# Patient Record
Sex: FEMALE | Race: ASIAN
[De-identification: names, ages, dates, MRNs, and addresses within clinical notes are randomized per-mention and may not be internally consistent; named-entity substitution may affect disease eponyms.]

---

## 2018-12-30 ENCOUNTER — HOSPITAL ENCOUNTER (EMERGENCY)
Dept: HOSPITAL 88 - FSED | Age: 28
Discharge: HOME | End: 2018-12-30
Payer: COMMERCIAL

## 2018-12-30 VITALS — BODY MASS INDEX: 24.54 KG/M2 | HEIGHT: 60 IN | WEIGHT: 125 LBS

## 2018-12-30 DIAGNOSIS — J11.1: ICD-10-CM

## 2018-12-30 DIAGNOSIS — R05: ICD-10-CM

## 2018-12-30 DIAGNOSIS — R50.9: Primary | ICD-10-CM

## 2018-12-30 PROCEDURE — 87400 INFLUENZA A/B EACH AG IA: CPT

## 2018-12-30 PROCEDURE — 99283 EMERGENCY DEPT VISIT LOW MDM: CPT

## 2019-08-11 ENCOUNTER — HOSPITAL ENCOUNTER (EMERGENCY)
Dept: HOSPITAL 88 - ER | Age: 29
Discharge: HOME | End: 2019-08-11
Payer: COMMERCIAL

## 2019-08-11 VITALS — BODY MASS INDEX: 24.54 KG/M2 | WEIGHT: 125 LBS | HEIGHT: 60 IN

## 2019-08-11 VITALS — SYSTOLIC BLOOD PRESSURE: 112 MMHG | DIASTOLIC BLOOD PRESSURE: 56 MMHG

## 2019-08-11 DIAGNOSIS — T39.314A: ICD-10-CM

## 2019-08-11 DIAGNOSIS — T78.3XXA: Primary | ICD-10-CM

## 2019-08-11 PROCEDURE — 96372 THER/PROPH/DIAG INJ SC/IM: CPT

## 2019-08-11 PROCEDURE — 99282 EMERGENCY DEPT VISIT SF MDM: CPT

## 2019-08-11 NOTE — XMS REPORT
Patient Summary Document

                             Created on: 2019



ROSA RODRIGUEZLY

External Reference #: [id number]

: 1990

Sex: Female



Demographics







                          Address                   [address] [place]  [address]

 

                          Home Phone                [phone]

 

                          Preferred Language        Unknown

 

                          Marital Status            Unknown

 

                          Gnosticist Affiliation     Unknown

 

                          Race                      Unknown

 

                                        Additional Race(s)  

 

                          Ethnic Group              Unknown





Author







                          Author                    [facility]

 

                          [facility]

 

                          Address                   Unknown

 

                          Phone                     Unavailable







Support







                Name            Relationship    Address         Phone

 

                    BRITTNEY RODRIGUEZ    PRS                 [address]

[place]  [address]                      [phone]

 

                    BRITTNEY RODRIGUEZ    PRS                 [address]

[place]  [address]                      [phone]

 

                    BRITTNEY RODRIGUEZ    PRS                 [address] [place]  [address]                      [phone]

 

                    BALJIT RODRIGUEZ    PRS                 [address] [place]  [address]                      [phone]







Care Team Providers







                    Care Team Member Name    Role                Phone

 

                          Unavailable               Unavailable







Payers







             Payer Name    Policy Type    Policy Number    Effective Date    Expiration Date







Problems

This patient has no known problems.



Allergies, Adverse Reactions, Alerts







          Allergy Name    Allergy Type    Status    Severity    Reaction(s)    Onset Date    Inactive 

Date                      Treating Clinician        Comments

 

        No Known Allergies    DA      Active    U               2019 00:00:00                     

 

        penicillin G    DA      Active    MO              2019 00:00:00                     

 

        No Known Allergies    DA      Active    U               2015 00:00:00                     







Medications

This patient has no known medications.



Results







           Test Description    Test Time    Test Comments    Text Results    Atomic Results    Result

 Comments

 

                [facility]        2019 17:06:00                    --------------------------------------------------------------------------------------------RUN

 DATE: 19                         [facility]                          
PAGE 1   RUN TIME: 1706                            Specimen Inquiry             
      RUN USER: INTERFACE                                                       
   ----------------------------------------------------------------
----------------------------PATIENT: JERRY RODRIGUEZ               ACCT #: 
[id number] LOC:  KELLY      U #: [id number]                                   
   AGE/SX: 28/F         [facility]     RE19REG DR:  Le Cohen MD            :    [birth year]     BED:  A          DIS: 19               
                       STATUS: DIS IN       TLOC:           
----------------------------
---------------------------------------------------------------- SPEC #: 
[id number]     RECD:      STATUS:  ALFREDO           RE #: 
[id number]                           BENJAMIN: 19-         [facility] DR: 
Le Cohen MD             ENTERED:      SP TYPE:      
 OTHR DR:                               ORDERED:  GROSS                         
                                                    PROCEDURES: GROSS (
133) TISSUES:           PRODUCTS OF CONCEPTION, NOS         CLINICAL HISTORY   
COLLECTION DATE: 19       IUP 16 3/7 WEEKS         FINAL DIAGNOSIS    
Products of conception:        SECOND TRIMESTER PLACENTA WITH MILD PERIVILLOUS 
FIBRINOID DEPOSITION        NO VILLITIS OR INFARCTION PRESENT        TRIVASCULAR
UMBILICAL CORD, FREE OF INFLAMMATION        FETAL MEMBRANES, FREE OF 
INFLAMMATION        NEGATIVE FOR MALIGNANCY       Fetus, passage:        FETUS 
WITH EXTERNAL GENITALIA COMPATIBLE WITH IMMATURE FEMALE         NO GROSS 
CONGENITAL ANOMALIES IDENTIFIED        APPROXIMATELY 17 WEEKS ESTIMATED 
GESTATIONAL AGE BY MEASUREMENTS        16 3/7 WEEKS ESTIMATED GESTATIONAL AGE BY
DATES               RRB/thad   D   [address], [address]                   MACROSCOPIC    
The specimen is received in formalin, labeled with the patient's name, and   
identified as "products of conception".  The specimen consists of a 10 X 7 X 2  
cm placental disc with tan, thickened, and opaque membranes.  The placental   
disc weighs 96.4 gm with the membranes and umbilical cord removed.  The fetal   
surface is steel blue, wrinkled, and glistening and displays an unremarkable   
vasculature.  The maternal surface is lobulated and intact.  Serial sectioning  
displays homogenous and maroon spongy tissue.  The trivascular umbilical cord   
measures 27 cm in length with a diameter of 0.7 cm and inserts 1.5 cm from the  
                             ** CONTINUED ON NEXT PAGE ** 
--------------------------------------------------------------------------------------------RUN
DATE: 19                         [facility]                          
PAGE 2   RUN TIME: 1706                            Specimen Inquiry             
      RUN USER: INTERFACE                                                       
   
--------------------------------------------------------------------------------------------SPEC
#: [id number]    PATIENT: JERRY RODRIGUEZ                #[id number]  
(Continued)--------------------------------------------------------------------------------------------
          MACROSCOPIC             (Continued)    closest placental edge.  No 
knots are present in the cord.  The placenta is   focally disrupted and the 
membranes appears to be  incomplete.       Received in the same container is a 
macerated immature fetus with no gross   abnormalities.  It measures as follow: 
crown rump 12.3, foot 1.9 cm head   circumference of 8.5 cm.  There is an 
attached umbilical cord with cord clamp.   The cord measures 1.0 cm in length.  
The skin is dark tan with sloughing over   the cranium and back.  The abdominal 
wall is disrupted at the umbilicus which   appears to be an artifactual defect. 
Intestinal loops protrude from the defect.   The face is normally formed.  The 
upper and lower extremeties are normally   formed. The spine is straight with no
defect.  The external genitalia are   compatible with an immature female.  No 
gross malformations are identified.  No   sections of the fetus are submitted.  
    Section code:  1A- membranes, maternal and fetal ends of cord; 1B   1C- full
  thickness of placenta.       GROSS PERFORMED AT [facility]   [address] [place]
[address]   [phone])[phone]           MICROSCOPIC    All of the stains, including 
any controls performed, stain appropriately.       MICROSCOPIC PERFORMED AT [facility]   [address] [place] [address] [phone]         PERFORMING SITE    Diagnosis 
performed at:        [facility]        [address] [place] [address] [phone]-----------------------------------------------
--------------------------------------------- Signed SIGNATURE ON FILE          
             August Chambers MD 19 1700  
-------------------------------------------------------------------------
-------------------                                      ** END OF REPORT **      

 

                CBC W/AUTO DIFF    2019 06:09:00                      

 

   

 

                WHITE BLOOD CELL (test code=WBC)    23.8 K/mm3      4.5-12.5         

 

                RED BLOOD CELL (test code=RBC)    3.79 mill/mm3    3.7-5.2          

 

                HEMOGLOBIN (test code=HGB)    11.0 gram/dL    11.5-15.5        

 

                HEMATOCRIT (test code=HCT)    33.1 %          36.0-46.0        

 

                MEAN CELL VOLUME (test code=MCV)    87.3 fL         80-98            

 

                MEAN CELL HGB (test code=MCH)    29.0 picogram    27.0-33.0        

 

                MEAN CELL HGB CONCETRATION (test code=MCHC)    33.2 gram/dL    33.0-36.0        

 

                RED CELL DISTRIBUTION WIDTH (test code=RDW)    12.2 %          11.6-16.2        

 

                RED CELL DISTRIBUTION WIDTH SD (test code=RDW-SD)    39.1 fL         37.0-51.0        

 

                PLATELET COUNT (test code=PLT)    253 K/mm3       150-450         RESULT VERIFIED BY REPEAT ANALYSIS



 

                MEAN PLATELET VOLUME (test code=MPV)    9.8 fL          6.7-11.0         

 

                NEUTROPHIL % (test code=NT%)    78.7 %          39.0-69.0        

 

                IMMATURE GRANULOCYTE % (test code=IG%)    0.5 %           0.0-5.0          

 

                LYMPHOCYTE % (test code=LY%)    14.0 %          25.0-55.0        

 

                MONOCYTE % (test code=MO%)    6.1 %           0.0-10.0         

 

                EOSINOPHIL % (test code=EO%)    0.5 %           0.0-5.0          

 

                BASOPHIL % (test code=BA%)    0.2 %           0.0-1.0          

 

                NUCLEATED RBC % (test code=NRBC%)    0.0 %           0-0              

 

                NEUTROPHIL # (test code=NT#)    18.69 K/mm3     1.8-7.7          

 

                IMMATURE GRANULOCYTE # (test code=IG#)    0.13 x10 3/uL    0-0.03           

 

                LYMPHOCYTE # (test code=LY#)    3.33 K/mm3      1.0-5.0          

 

                MONOCYTE # (test code=MO#)    1.46 K/mm3      0-0.8            

 

                EOSINOPHIL # (test code=EO#)    0.11 K/mm3      0.0-0.5          

 

                BASOPHIL # (test code=BA#)    0.05 K/mm3      0.0-0.2          

 

                NUCLEATED RBC # (test code=NRBC#)    0.00 K/mm3      0.0-0.1          

 

                MANUAL DIFF REQUIRED (test code=MDIFF)    NO                               





URINALYSIS [id number] 09:07:00* 





                Test Item       Value           Reference Range    Comments

 

                UA COLOR (test code=COLU)    YELLOW          YELLOW           

 

                UA APPEARANCE (test code=APPU)    Cloudy          CLEAR            

 

                UA GLUCOSE DIPSTICK (test code=DGLUU)    NEGATIVE mg/dL    NEGATIVE         

 

                UA BILIRUBIN DIPSTICK (test code=BILU)    NEGATIVE mg/dL    NEGATIVE         

 

                UA KETONE DIPSTICK (test code=KETU)    NEGATIVE mg/dL    NEGATIVE         

 

                UA SPECIFIC GRAVITY (test code=SGU)    1.014           1.001-1.035      

 

                UA BLOOD DIPSTICK (test code=MEAGAN)    3+ (Large) mg/dL    NEGATIVE         

 

                UA PH DIPSTICK (test code=ADA)    8.0             5.0-8.0          

 

                UA PROTEIN DIPSTICK (test code=PROU)    NEGATIVE mg/dL    NEGATIVE         

 

                UA UROBILINIOGEN DIPSTICK (test code=URO)    NEGATIVE mg/dL    NEGATIVE         

 

                UA NITRITE DIPSTICK (test code=JASEN)    NEGATIVE        NEGATIVE         

 

                UA LEUKOCYTE ESTERASE W REFLEX (test code=LEUUR)    NEGATIVE Angel/uL    NEGATIVE         

 

                UA WBC (test code=WBCU)    0-5 per HPF     0-5              

 

                UA RBC (test code=RBCU)    3-5 #/HPF       0-5              

 

                UA WBC CLUMPS (test code=WBCUCL)    3-6 /HPF        NONE             

 

                UA EPITHELIAL CELLS (test code=EPIU)    FEW per HPF     FEW              

 

                UA BACTERIA (test code=BACU)    MANY #/HPF      NONE             

 

                UA RENAL CELLS (test code=ROBERT)    6-10 #/HPF      0-5              

 

                UA AMORPHOUS SEDIMENT (test code=AMORU)    MANY #/LPF      NONE             





Urine Source? Clean CatchURINALYSIS [id number] 09:01:00* 





                Test Item       Value           Reference Range    Comments

 

                UA COLOR (test code=COLU)    YELLOW          YELLOW           

 

                UA APPEARANCE (test code=APPU)    Cloudy          CLEAR            

 

                UA GLUCOSE DIPSTICK (test code=DGLUU)    NEGATIVE mg/dL    NEGATIVE         

 

                UA BILIRUBIN DIPSTICK (test code=BILU)    NEGATIVE mg/dL    NEGATIVE         

 

                UA KETONE DIPSTICK (test code=KETU)    NEGATIVE mg/dL    NEGATIVE         

 

                UA SPECIFIC GRAVITY (test code=SGU)    1.014           1.001-1.035      

 

                UA BLOOD DIPSTICK (test code=MEAGAN)    3+ (Large) mg/dL    NEGATIVE         

 

                UA PH DIPSTICK (test code=ADA)    8.0             5.0-8.0          

 

                UA PROTEIN DIPSTICK (test code=PROU)    NEGATIVE mg/dL    NEGATIVE         

 

                UA UROBILINIOGEN DIPSTICK (test code=URO)    NEGATIVE mg/dL    NEGATIVE         

 

                UA NITRITE DIPSTICK (test code=JASEN)    NEGATIVE        NEGATIVE         

 

                UA LEUKOCYTE ESTERASE W REFLEX (test code=LEUUR)    NEGATIVE Angel/uL    NEGATIVE         

 

                UA WBC (test code=WBCU)     per HPF        0-5              

 

                UA RBC (test code=RBCU)    3-5 #/HPF       0-5              

 

                UA WBC CLUMPS (test code=WBCUCL)    3-6 /HPF        NONE             

 

                UA EPITHELIAL CELLS (test code=EPIU)    FEW per HPF     FEW              

 

                UA BACTERIA (test code=BACU)    MANY #/HPF      NONE             

 

                UA RENAL CELLS (test code=ROBERT)    6-10 #/HPF      0-5              

 

                UA AMORPHOUS SEDIMENT (test code=AMORU)    MANY #/LPF      NONE             





Urine Source? Clean CatchURINALYSIS [id number] 08:43:00* 





                Test Item       Value           Reference Range    Comments

 

                UA COLOR (test code=COLU)    YELLOW          YELLOW           

 

                UA APPEARANCE (test code=APPU)    Cloudy          CLEAR            

 

                UA GLUCOSE DIPSTICK (test code=DGLUU)    NEGATIVE mg/dL    NEGATIVE         

 

                UA BILIRUBIN DIPSTICK (test code=BILU)    NEGATIVE mg/dL    NEGATIVE         

 

                UA KETONE DIPSTICK (test code=KETU)    NEGATIVE mg/dL    NEGATIVE         

 

                UA SPECIFIC GRAVITY (test code=SGU)    1.014           1.001-1.035      

 

                UA BLOOD DIPSTICK (test code=MEAGAN)    3+ (Large) mg/dL    NEGATIVE         

 

                UA PH DIPSTICK (test code=ADA)    8.0             5.0-8.0          

 

                UA PROTEIN DIPSTICK (test code=PROU)    NEGATIVE mg/dL    NEGATIVE         

 

                UA UROBILINIOGEN DIPSTICK (test code=URO)    NEGATIVE mg/dL    NEGATIVE         

 

                UA NITRITE DIPSTICK (test code=JASEN)    NEGATIVE        NEGATIVE         

 

                UA LEUKOCYTE ESTERASE W REFLEX (test code=LEUUR)    NEGATIVE Angel/uL    NEGATIVE         

 

                UA WBC (test code=WBCU)     per HPF        0-5              

 

                UA RBC (test code=RBCU)     per HPF        0-5              

 

                UA EPITHELIAL CELLS (test code=EPIU)     per HPF        Few              

 

                UA BACTERIA (test code=BACU)     per HPF        NONE             





Urine Source? Clean CatchHCG SERUM [id number] 00:05:00* 





                Test Item       Value           Reference Range    Comments

 

                HCG SERUM BETA (test code=HCG)    84681.0 mIU/mL    0-3             Interfering substances present

 in the serum of somepatients may cause a false-positive result in this 
assay.Questionable elevations in serum hCG should be confirmedwith a urine hCG. 
Suspected Trophoblastic Neoplasms shouldnot be diagnosed based on serun hCG/beta
hCG alone. Theymust be confirmed by clinical history and tissue 
diagnosis.INTERPRETATION:B-HCG LEVELS <5 SHOULD BE CONSIDERED AS "NEGATIVE." 
*WHEN BODERLINE RESULTS ARE ENCOUNTERED,PATIENT SAMPLESSHOULD BE REDRAWN 48 
HOURS.***** 0-1 WEEKS AFTER CONCEPTION               5-50      MIU/ML1-2 WEEKS 
AFTER CONCEPTION                   MIU/ML2-3 WEEKS AFTER CONCEPTION       
     100 -5,000  MIU/ML3-4 WEEKS AFTER CONCEPTION             500-10,000  
MIU/ML4-5 WEEKS AFTER CONCEPTION          1000 -50,000   MIU/ML5-6 WEEKS AFTER 
CONCEPTION         10,000-100,000  MIU/ML6-8 WEEKS AFTER CONCEPTION         
15,000- 200,000 MIU/ML2-3 MONTHS AFTER CONCEPTION         10,000-100,000 MIU/ML





COMPREHENSIVE METABOLIC [id number] 23:54:00* 





                Test Item       Value           Reference Range    Comments

 

                SODIUM (test code=NA)    139 mmol/L      136-145          

 

                POTASSIUM (test code=K)    3.8 mmol/L      3.5-5.1          

 

                CHLORIDE (test code=CL)    107.0 mmol/L               

 

                CARBON DIOXIDE (test code=CO2)    27.0 mmol/L     21-32            

 

                ANION GAP (test code=GAP)    8.8             10-20            

 

                GLUCOSE (test code=GLU)    125 mg/dL                  

 

                BLOOD UREA NITROGEN (test code=BUN)    6 mg/dL         7-18             

 

                GLOMERULAR FILTRATION RATE (test code=GFR)    > 60 mL/min     >=60            Estimated GFR by 

using Modified MDRD formula.Chronic kidney disease is defined as either kidney 
damageor GFR <60 mL/min/1.73 m2 for >3 months.

 

                CREATININE (test code=CREAT)    0.60 mg/dL      0.55-1.02       **Note change in reference 

range due to change in reagent.**

 

                BUN/CREATININE RATIO (test code=BUN/CREA)    10.0            10-20            

 

                TOTAL PROTEIN (test code=PROT)    7.7 gram/dL     6.4-8.2          

 

                ALBUMIN (test code=ALB)    3.4 g/dL        3.4-5.0          

 

                GLOBULIN (test code=GLOB)    4.3 gram/dL     2.7-4.2          

 

                ALBUMIN/GLOBULIN RATIO (test code=A/G)    0.8             0.75-1.50        

 

                CALCIUM (test code=CA)    9.6 mg/dL       8.5-10.1         

 

                BILIRUBIN TOTAL (test code=BILT)    0.10 mg/dL      0.0-1.0          

 

                SGOT/AST (test code=AST)    69 IUnit/L      15-37            

 

                SGPT/ALT (test code=ALT)    160 IUnit/L     12-78            

 

                ALKALINE PHOSPHATASE TOTAL (test code=ALKP)    141 IUnit/L               **Note change 

in reference range due to change in reagent.**





[id number] 23:41:00* 





                Test Item       Value           Reference Range    Comments

 

                FIBRINOGEN (test code=FIB)    544 mg/dL       200-400          





COMPREHENSIVE METABOLIC [id number] 23:38:00* 





                Test Item       Value           Reference Range    Comments

 

                SODIUM (test code=NA)    139 mmol/L      136-145          

 

                POTASSIUM (test code=K)    3.8 mmol/L      3.5-5.1          

 

                CHLORIDE (test code=CL)    107.0 mmol/L               

 

                CARBON DIOXIDE (test code=CO2)     mmol/L         21-32            

 

                ANION GAP (test code=GAP)                    10-20            

 

                GLUCOSE (test code=GLU)     mg/dL                     

 

                BLOOD UREA NITROGEN (test code=BUN)     mg/dL          7-18             

 

                GLOMERULAR FILTRATION RATE (test code=GFR)     mL/min         >=60             

 

                CREATININE (test code=CREAT)     mg/dL          0.55-1.02        

 

                BUN/CREATININE RATIO (test code=BUN/CREA)                    10-20            

 

                TOTAL PROTEIN (test code=PROT)     gram/dL        6.4-8.2          

 

                ALBUMIN (test code=ALB)     g/dL           3.4-5.0          

 

                GLOBULIN (test code=GLOB)     gram/dL        2.7-4.2          

 

                ALBUMIN/GLOBULIN RATIO (test code=A/G)                    0.75-1.50        

 

                CALCIUM (test code=CA)     mg/dL          8.5-10.1         

 

                BILIRUBIN TOTAL (test code=BILT)     mg/dL          0.0-1.0          

 

                SGOT/AST (test code=AST)     IUnit/L        15-37            

 

                SGPT/ALT (test code=ALT)     IUnit/L        12-78            

 

                ALKALINE PHOSPHATASE TOTAL (test code=ALKP)     IUnit/L                   





CBC W/O [id number] 23:28:00* 





                Test Item       Value           Reference Range    Comments

 

                WHITE BLOOD CELL (test code=WBC)     K/mm3          4.5-12.5         

 

                RED BLOOD CELL (test code=RBC)     mill/mm3       3.7-5.2          

 

                HEMOGLOBIN (test code=HGB)     gram/dL        11.5-15.5        

 

                HEMATOCRIT (test code=HCT)    36.0 %          36.0-46.0        

 

                MEAN CELL VOLUME (test code=MCV)     fL             80-98            

 

                MEAN CELL HGB (test code=MCH)     picogram       27.0-33.0        

 

                MEAN CELL HGB CONCETRATION (test code=MCHC)     gram/dL        33.0-36.0        

 

                RED CELL DISTRIBUTION WIDTH (test code=RDW)     %              11.6-16.2        

 

                PLATELET COUNT (test code=PLT)     K/mm3          150-450          

 

                MEAN PLATELET VOLUME (test code=MPV)     fL             6.7-11.0         





CBC W/O [id number] 23:28:00* 





                Test Item       Value           Reference Range    Comments

 

                WHITE BLOOD CELL (test code=WBC)    18.6 K/mm3      4.5-12.5         

 

                RED BLOOD CELL (test code=RBC)    4.09 mill/mm3    3.7-5.2          

 

                HEMOGLOBIN (test code=HGB)    11.4 gram/dL    11.5-15.5        

 

                HEMATOCRIT (test code=HCT)    36.0 %          36.0-46.0        

 

                MEAN CELL VOLUME (test code=MCV)    88.0 fL         80-98            

 

                MEAN CELL HGB (test code=MCH)    27.9 picogram    27.0-33.0        

 

                MEAN CELL HGB CONCETRATION (test code=MCHC)    31.7 gram/dL    33.0-36.0        

 

                RED CELL DISTRIBUTION WIDTH (test code=RDW)    12.3 %          11.6-16.2        

 

                PLATELET COUNT (test code=PLT)    305 K/mm3       150-450          

 

                MEAN PLATELET VOLUME (test code=MPV)    9.8 fL          6.7-11.0         





AMNISURE (ROM) [id number] 14:07:00* 





                Test Item       Value           Reference Range    Comments

 

                AMNISURE (ROM) TEST (test code=AMNI)    POSITIVE        NEGATIVE        OPENED:3/10/2019  





- US PREG AFTER  [id number] 13:51:00  Name: JERRY RODRIGUEZ               
   [facility]                    : 1990 Age/S: 28  / F         [address] [place] [address]         Unit #: [id number]     Loc:               [place] [address]                 Phys: Bharath Vasquez NP                                
                  Acct: [id number]  Dis Date:               Status: REG ER    
                             PHONE #: [phone]     Exam Date: 2019  
1341                     FAX #: [phone]      Reason: Pelvic Pain           
                             EXAMS:                                             
 CPT CODE:      [id number] US PREG AFTER  TRI                      [id number]      
             PREGNANCY ULTRASOUND; 1ST TRIMESTER:               HISTORY: 15 week
pregnancy by dates.  Leaking lots of fluid.               COMPARISON EXAMS: No 
pertinent recent exams for comparison.               TECHNIQUE: Sonographic 
evaluation was performed using high resolution       B-mode, pulse and color 
Doppler imaging.               FINDINGS: An intrauterine fetus is identified 
with average crown-rump       length of 6.28 cm which corresponds to an 
ultrasound age of 12 weeks 5       days.  Fetal cardiac activity was verified at
133 bpm.               However, no amniotic fluid is identified.  No evidence of
placenta       previa.               The right ovary measures up to 3.0 cm with 
normal Doppler analysis.        The left ovary measures up to 2.6 cm with normal
Doppler analysis.  No       free fluid in the cul-de-sac.                 
IMPRESSION:         1.  Single intrauterine living fetus estimated to be 12 
weeks 5 days         ultrasound age.         2.  No amniotic fluid identified.  
      3.  Negative evaluation of the ovaries and adnexal regions.               
   SL:01                  ** Electronically Signed by MICHAEL Hernandez **
         **               on 2019 at 1351              **                 
    Reported and signed by: Sterling Hernandez M.D.            CC: Bharath Vasquez NP                                                                       
                                                Technologist: Serina Suresh RDMS(ISADORA)(BR)                             Trnscb Date/Time: 2019 (2509) 
tJONH                        Orig Print D/T: S: 2019 (6169)     Probe:
                      PAGE  1                       Signed Report               
               CBC W/AUTO [id number] 13:01:00* 





                Test Item       Value           Reference Range    Comments

 

                WHITE BLOOD CELL (test code=WBC)    17.51 x10 3/uL    4.5-11.0         

 

                RED BLOOD CELL (test code=RBC)    4.09 x10 6/uL    3.54-5.02        

 

                HEMOGLOBIN (test code=HGB)    12.2 g/dL       11.0-15.0        

 

                HEMATOCRIT (test code=HCT)    36.3 %          33.0-45.0        

 

                MEAN CELL VOLUME (test code=MCV)    88.8 fL         81.0-99.0        

 

                MEAN CELL HGB (test code=MCH)    29.8 pg         27.0-33.0        

 

                MEAN CELL HGB CONCETRATION (test code=MCHC)    33.6 g/dL       33.0-37.0        

 

                RED CELL DISTRIBUTION WIDTH CV (test code=RDW)    12.3 %          11.5-14.5        

 

                RED CELL DISTRIBUTION WIDTH SD (test code=RDW-SD)    40.3 fL         37.0-54.0        

 

                PLATELET COUNT (test code=PLT)    296 x10 3/uL    150-400          

 

                MEAN PLATELET VOLUME (test code=MPV)    9.9 fL          7.0-9.0          

 

                NEUTROPHIL % (test code=NT%)    73.0 %          56.0-77.0        

 

                IMMATURE GRANULOCYTE % (test code=IG%)    0.5 %           0.0-2.0          

 

                LYMPHOCYTE % (test code=LY%)    19.7 %          14.0-32.0        

 

                MONOCYTE % (test code=MO%)    5.5 %           4.8-9.0          

 

                EOSINOPHIL % (test code=EO%)    1.0 %           0.3-3.7          

 

                BASOPHIL % (test code=BA%)    0.3 %           0.0-2.0          

 

                NUCLEATED RBC % (test code=NRBC%)    0.0 %           0-0              

 

                NEUTROPHIL # (test code=NT#)    12.77 x10 3/uL    2.0-7.6          

 

                IMMATURE GRANULOCYTE # (test code=IG#)    0.09 x10 3/uL    0.00-0.03        

 

                LYMPHOCYTE # (test code=LY#)    3.45 x10 3/uL    1.0-3.8          

 

                MONOCYTE # (test code=MO#)    0.97 x10 3/uL    0.1-0.8          

 

                EOSINOPHIL # (test code=EO#)    0.18 x10 3/uL    0.0-0.2          

 

                BASOPHIL # (test code=BA#)    0.05 x10 3/uL    0.0-0.2          

 

                NUCLEATED RBC # (test code=NRBC#)    0.00 x10 3/uL    0.0-0.1          

 

                MANUAL DIFF REQUIRED (test code=MDIFF)    NO                               





URINALYSIS [id number] 12:54:00* 





                Test Item       Value           Reference Range    Comments

 

                UA COLOR (test code=COLU)    YELLOW          YEL/STRAW        

 

                UA APPEARANCE (test code=APPU)    SL CLOUDY       CLEAR            

 

                UA GLUCOSE DIPSTICK (test code=DGLUU)    NEGATIVE        NEGATIVE         

 

                UA BILIRUBIN DIPSTICK (test code=BILU)    NEGATIVE        NEGATIVE         

 

                UA KETONE DIPSTICK (test code=KETU)    NEGATIVE        NEGATIVE         

 

                UA SPECIFIC GRAVITY (test code=SGU)    1.016           1.005-1.030      

 

                UA BLOOD DIPSTICK (test code=MEAGAN)    1+              NEGATIVE         

 

                UA PH DIPSTICK (test code=ADA)    6.0             5.0-7.0          

 

                UA PROTEIN DIPSTICK (test code=PROU)    NEGATIVE        NEGATIVE         

 

                UA UROBILINIOGEN DIPSTICK (test code=URO)    0.2 mg/dL       0.2-1.0          

 

                UA NITRITE DIPSTICK (test code=JASEN)    NEGATIVE        NEGATIVE         

 

                UA LEUKOCYTE ESTERASE DIPSTICK (test code=LEUU)    NEGATIVE        NEGATIVE         

 

                UA WBC (test code=WBCU)    4-9 WBC/HPF     0-3              

 

                UA RBC (test code=RBCU)    21-50 RBC/HPF    0-3              

 

                UA BACTERIA (test code=BACU)    TRACE /HPF      NONE SEEN        

 

                UA SQUAMOUS CELLS (test code=SQU)    0-5 /HPF        NONE SEEN        

 

                UA MUCUS (test code=MUCU)    TRACE /LPF      NONE SEEN        





COMMENTS: Clean CatchCHEMISTRY 8 [id number] 12:35:00* 





                Test Item       Value           Reference Range    Comments

 

                ISTAT-SODIUM (test code=NAP)     MMOL/L         134-147          

 

                ISTAT-POTASSIUM (test code=KP)     MMOL/L         3.4-5.0          

 

                ISTAT-CHLORIDE (test code=CLP)     MMOL/L         100-108          

 

                ISTAT CARBON DIOXIDE (test code=ISTAT-CO2)     mmol/L         21-33            

 

                ISTAT CALCIUM IONIZED (test code=ISTAT-LISA)     MG/DL          1.12-1.32        

 

                ISTAT-GLUCOSE (test code=GLUP)     MG/DL                     

 

                ISTAT-BUN (test code=BUNP)     MG/DL          7-18             

 

                BEDSIDE CREATININE (test code=CREATBED)     MG/DL          0.6-1.3          

 

                GLOMERULAR FILTRATION RATE POC (test code=GFRBED)    204 ML/MIN                       





CHEMISTRY 8 [id number] 12:35:00* 





                Test Item       Value           Reference Range    Comments

 

                ISTAT-SODIUM (test code=NAP)    138 MMOL/L      134-147          

 

                ISTAT-POTASSIUM (test code=KP)    3.4 MMOL/L      3.4-5.0          

 

                ISTAT-CHLORIDE (test code=CLP)    105 MMOL/L      100-108         Performed by certified 

 at  [facility]

 

                ISTAT CARBON DIOXIDE (test code=ISTAT-CO2)    24.0 mmol/L     21-33            

 

                ISTAT CALCIUM IONIZED (test code=ISTAT-LISA)    1.17 MG/DL      1.12-1.32        

 

                ISTAT-GLUCOSE (test code=GLUP)    83 MG/DL                   

 

                ISTAT-BUN (test code=BUNP)    4 MG/DL         7-18             

 

                BEDSIDE CREATININE (test code=CREATBED)    0.4 MG/DL       0.6-1.3          

 

                GLOMERULAR FILTRATION RATE POC (test code=GFRBED)    204 ML/MIN